# Patient Record
Sex: FEMALE | Race: WHITE | Employment: UNEMPLOYED | URBAN - METROPOLITAN AREA
[De-identification: names, ages, dates, MRNs, and addresses within clinical notes are randomized per-mention and may not be internally consistent; named-entity substitution may affect disease eponyms.]

---

## 2017-08-31 ENCOUNTER — APPOINTMENT (OUTPATIENT)
Dept: GENERAL RADIOLOGY | Age: 47
End: 2017-08-31
Attending: EMERGENCY MEDICINE
Payer: SELF-PAY

## 2017-08-31 ENCOUNTER — HOSPITAL ENCOUNTER (EMERGENCY)
Age: 47
Discharge: HOME | End: 2017-08-31
Attending: EMERGENCY MEDICINE
Payer: SELF-PAY

## 2017-08-31 VITALS
TEMPERATURE: 97.9 F | BODY MASS INDEX: 22.5 KG/M2 | OXYGEN SATURATION: 100 % | HEART RATE: 93 BPM | DIASTOLIC BLOOD PRESSURE: 69 MMHG | HEIGHT: 63 IN | SYSTOLIC BLOOD PRESSURE: 117 MMHG | RESPIRATION RATE: 18 BRPM | WEIGHT: 127 LBS

## 2017-08-31 DIAGNOSIS — S93.401A SPRAIN OF RIGHT ANKLE, UNSPECIFIED LIGAMENT, INITIAL ENCOUNTER: Primary | ICD-10-CM

## 2017-08-31 PROCEDURE — 99283 EMERGENCY DEPT VISIT LOW MDM: CPT | Performed by: EMERGENCY MEDICINE

## 2017-08-31 PROCEDURE — 74011250637 HC RX REV CODE- 250/637: Performed by: NURSE PRACTITIONER

## 2017-08-31 PROCEDURE — 75810000053 HC SPLINT APPLICATION: Performed by: EMERGENCY MEDICINE

## 2017-08-31 PROCEDURE — 73630 X-RAY EXAM OF FOOT: CPT

## 2017-08-31 RX ORDER — HYDROCODONE BITARTRATE AND ACETAMINOPHEN 5; 325 MG/1; MG/1
1 TABLET ORAL
Qty: 20 TAB | Refills: 0 | Status: SHIPPED | OUTPATIENT
Start: 2017-08-31

## 2017-08-31 RX ORDER — HYDROCODONE BITARTRATE AND ACETAMINOPHEN 5; 325 MG/1; MG/1
1 TABLET ORAL
Status: COMPLETED | OUTPATIENT
Start: 2017-08-31 | End: 2017-08-31

## 2017-08-31 RX ADMIN — HYDROCODONE BITARTRATE AND ACETAMINOPHEN 1 TABLET: 5; 325 TABLET ORAL at 18:38

## 2017-08-31 NOTE — ED PROVIDER NOTES
HPI Comments: Patient states she slipped and fell. She states she landed on her right ankle. She has noted swelling and bruising and to the lateral aspect of her right ankle. She states unable to walk due to pain. Patient is a 50 y.o. female presenting with foot pain. The history is provided by the patient. Foot Pain    This is a new problem. The current episode started 6 to 12 hours ago. The problem occurs constantly. The problem has not changed since onset. The pain is present in the right ankle and right foot. The quality of the pain is described as pounding. The pain is at a severity of 10/10. The pain is moderate. Pertinent negatives include no numbness, full range of motion, no stiffness, no tingling, no itching, no back pain and no neck pain. The symptoms are aggravated by movement and palpation. She has tried nothing for the symptoms. There has been a history of trauma. Past Medical History:   Diagnosis Date    Acute appendicitis with generalized peritonitis 6/26/2009    Cancer ([facility] [address]     cervical    Gallbladder attack     GERD (gastroesophageal reflux disease)     Psychiatric disorder     anxiety       Past Surgical History:   Procedure Laterality Date    HX APPENDECTOMY      open RT rupture    HX GYN      hysterectomy. .         No family history on file. Social History     Social History    Marital status: SINGLE     Spouse name: N/A    Number of children: N/A    Years of education: N/A     Occupational History    Not on file. Social History Main Topics    Smoking status: Never Smoker    Smokeless tobacco: Not on file    Alcohol use Yes      Comment: social    Drug use: No    Sexual activity: Not on file     Other Topics Concern    Not on file     Social History Narrative    No narrative on file         ALLERGIES: Sulfa (sulfonamide antibiotics)    Review of Systems   Constitutional: Negative for chills and fever.    Respiratory: Negative for cough and shortness of breath. Gastrointestinal: Negative for abdominal pain. Musculoskeletal: Positive for arthralgias and joint swelling. Negative for back pain, neck pain and stiffness. Skin: Positive for color change. Negative for itching. Neurological: Negative for dizziness, tingling, weakness and numbness. Vitals:    08/31/17 1702   BP: 117/69   Pulse: 93   Resp: 18   Temp: 97.9 °F (36.6 °C)   SpO2: 100%   Weight: 57.6 kg (127 lb)   Height: 5' 3\" (1.6 m)            Physical Exam   Constitutional: She is oriented to person, place, and time. She appears well-developed and well-nourished. No distress. Cardiovascular: Normal rate and regular rhythm. No murmur heard. Pulmonary/Chest: Effort normal and breath sounds normal.   Musculoskeletal:        Left knee: Normal.        Left ankle: She exhibits decreased range of motion (due to pain), swelling and ecchymosis. She exhibits no laceration and normal pulse. Tenderness. Lateral malleolus tenderness found. Left lower leg: Normal.        Left foot: There is bony tenderness and swelling. There is normal capillary refill and no deformity. Neurological: She is alert and oriented to person, place, and time. Skin: She is not diaphoretic. Psychiatric: She has a normal mood and affect. Her behavior is normal.   Nursing note and vitals reviewed. Xr Foot Rt Min 3 V    Result Date: 8/31/2017  RIGHT FOOT SERIES HISTORY: Patient slipped and fell and heard snap in right foot. Pain in lower leg. FINDINGS: There is no displaced fracture, malalignment or osseous erosions. IMPRESSION: No displaced fracture. MDM  Number of Diagnoses or Management Options  Sprain of right ankle, unspecified ligament, initial encounter: new and requires workup  Diagnosis management comments: No acute changes noted on xray. Patient given ace wrap and ankle splint. Patient given po norco and prescription for at home.  Patient non weight bearing for the next 3 days and follow up with her pcp at the first of the week for recheck.         Amount and/or Complexity of Data Reviewed  Tests in the radiology section of CPT®: ordered and reviewed  Tests in the medicine section of CPT®: ordered    Patient Progress  Patient progress: stable    ED Course       Procedures

## 2017-08-31 NOTE — DISCHARGE INSTRUCTIONS
Ankle Sprain: Care Instructions  Your Care Instructions    An ankle sprain can happen when you twist your ankle. The ligaments that support the ankle can get stretched and torn. Often the ankle is swollen and painful. Ankle sprains may take from several weeks to several months to heal. Usually, the more pain and swelling you have, the more severe your ankle sprain is and the longer it will take to heal. You can heal faster and regain strength in your ankle with good home treatment. It is very important to give your ankle time to heal completely, so that you do not easily hurt your ankle again. Follow-up care is a key part of your treatment and safety. Be sure to make and go to all appointments, and call your doctor if you are having problems. It's also a good idea to know your test results and keep a list of the medicines you take. How can you care for yourself at home? · Prop up your foot on pillows as much as possible for the next 3 days. Try to keep your ankle above the level of your heart. This will help reduce the swelling. · Follow your doctor's directions for wearing a splint or elastic bandage. Wrapping the ankle may help reduce or prevent swelling. · Your doctor may give you a splint, a brace, an air stirrup, or another form of ankle support to protect your ankle until it is healed. Wear it as directed while your ankle is healing. Do not remove it unless your doctor tells you to. After your ankle has healed, ask your doctor whether you should wear the brace when you exercise. · Put ice or cold packs on your injured ankle for 10 to 20 minutes at a time. Try to do this every 1 to 2 hours for the next 3 days (when you are awake) or until the swelling goes down. Put a thin cloth between the ice and your skin. · You may need to use crutches until you can walk without pain. If you do use crutches, try to bear some weight on your injured ankle if you can do so without pain.  This helps the ankle heal.  · Take pain medicines exactly as directed. ¨ If the doctor gave you a prescription medicine for pain, take it as prescribed. ¨ If you are not taking a prescription pain medicine, ask your doctor if you can take an over-the-counter medicine. · If you have been given ankle exercises to do at home, do them exactly as instructed. These can promote healing and help prevent lasting weakness. When should you call for help? Call your doctor now or seek immediate medical care if:  · Your pain is getting worse. · Your swelling is getting worse. · Your splint feels too tight or you are unable to loosen it. Watch closely for changes in your health, and be sure to contact your doctor if:  · You are not getting better after 1 week. Where can you learn more? Go to http://santo-joanna.info/. Enter O388 in the search box to learn more about \"Ankle Sprain: Care Instructions. \"  Current as of: March 21, 2017  Content Version: 11.3  © 0189-9681 [employer]. Care instructions adapted under license by [employer] (which disclaims liability or warranty for this information). If you have questions about a medical condition or this instruction, always ask your healthcare professional. [place] [address] any warranty or liability for your use of this information.